# Patient Record
Sex: FEMALE | Race: ASIAN | NOT HISPANIC OR LATINO | URBAN - METROPOLITAN AREA
[De-identification: names, ages, dates, MRNs, and addresses within clinical notes are randomized per-mention and may not be internally consistent; named-entity substitution may affect disease eponyms.]

---

## 2017-04-26 PROBLEM — Z00.00 ENCOUNTER FOR PREVENTIVE HEALTH EXAMINATION: Status: ACTIVE | Noted: 2017-04-26

## 2017-05-04 ENCOUNTER — OUTPATIENT (OUTPATIENT)
Dept: OUTPATIENT SERVICES | Facility: HOSPITAL | Age: 57
LOS: 1 days | End: 2017-05-04
Payer: MEDICAID

## 2017-05-04 VITALS
DIASTOLIC BLOOD PRESSURE: 68 MMHG | HEIGHT: 65 IN | HEART RATE: 72 BPM | WEIGHT: 136.03 LBS | SYSTOLIC BLOOD PRESSURE: 112 MMHG | TEMPERATURE: 98 F | RESPIRATION RATE: 15 BRPM

## 2017-05-04 DIAGNOSIS — K81.2 ACUTE CHOLECYSTITIS WITH CHRONIC CHOLECYSTITIS: ICD-10-CM

## 2017-05-04 DIAGNOSIS — K80.20 CALCULUS OF GALLBLADDER WITHOUT CHOLECYSTITIS WITHOUT OBSTRUCTION: ICD-10-CM

## 2017-05-04 DIAGNOSIS — Z98.890 OTHER SPECIFIED POSTPROCEDURAL STATES: Chronic | ICD-10-CM

## 2017-05-04 DIAGNOSIS — E11.9 TYPE 2 DIABETES MELLITUS WITHOUT COMPLICATIONS: ICD-10-CM

## 2017-05-04 LAB
ALBUMIN SERPL ELPH-MCNC: 4.5 G/DL — SIGNIFICANT CHANGE UP (ref 3.3–5)
ALP SERPL-CCNC: 98 U/L — SIGNIFICANT CHANGE UP (ref 40–120)
ALT FLD-CCNC: 34 U/L — HIGH (ref 4–33)
AST SERPL-CCNC: 21 U/L — SIGNIFICANT CHANGE UP (ref 4–32)
BILIRUB SERPL-MCNC: 0.5 MG/DL — SIGNIFICANT CHANGE UP (ref 0.2–1.2)
BLD GP AB SCN SERPL QL: NEGATIVE — SIGNIFICANT CHANGE UP
BUN SERPL-MCNC: 14 MG/DL — SIGNIFICANT CHANGE UP (ref 7–23)
CALCIUM SERPL-MCNC: 9.5 MG/DL — SIGNIFICANT CHANGE UP (ref 8.4–10.5)
CHLORIDE SERPL-SCNC: 106 MMOL/L — SIGNIFICANT CHANGE UP (ref 98–107)
CO2 SERPL-SCNC: 26 MMOL/L — SIGNIFICANT CHANGE UP (ref 22–31)
CREAT SERPL-MCNC: 0.67 MG/DL — SIGNIFICANT CHANGE UP (ref 0.5–1.3)
GLUCOSE SERPL-MCNC: 180 MG/DL — HIGH (ref 70–99)
HBA1C BLD-MCNC: 8.2 % — HIGH (ref 4–5.6)
HCT VFR BLD CALC: 41.3 % — SIGNIFICANT CHANGE UP (ref 34.5–45)
HGB BLD-MCNC: 13.1 G/DL — SIGNIFICANT CHANGE UP (ref 11.5–15.5)
MCHC RBC-ENTMCNC: 27.8 PG — SIGNIFICANT CHANGE UP (ref 27–34)
MCHC RBC-ENTMCNC: 31.7 % — LOW (ref 32–36)
MCV RBC AUTO: 87.5 FL — SIGNIFICANT CHANGE UP (ref 80–100)
PLATELET # BLD AUTO: 286 K/UL — SIGNIFICANT CHANGE UP (ref 150–400)
PMV BLD: 10.2 FL — SIGNIFICANT CHANGE UP (ref 7–13)
POTASSIUM SERPL-MCNC: 3.8 MMOL/L — SIGNIFICANT CHANGE UP (ref 3.5–5.3)
POTASSIUM SERPL-SCNC: 3.8 MMOL/L — SIGNIFICANT CHANGE UP (ref 3.5–5.3)
PROT SERPL-MCNC: 8 G/DL — SIGNIFICANT CHANGE UP (ref 6–8.3)
RBC # BLD: 4.72 M/UL — SIGNIFICANT CHANGE UP (ref 3.8–5.2)
RBC # FLD: 13.5 % — SIGNIFICANT CHANGE UP (ref 10.3–14.5)
RH IG SCN BLD-IMP: POSITIVE — SIGNIFICANT CHANGE UP
SODIUM SERPL-SCNC: 146 MMOL/L — HIGH (ref 135–145)
WBC # BLD: 8.27 K/UL — SIGNIFICANT CHANGE UP (ref 3.8–10.5)
WBC # FLD AUTO: 8.27 K/UL — SIGNIFICANT CHANGE UP (ref 3.8–10.5)

## 2017-05-04 PROCEDURE — 93010 ELECTROCARDIOGRAM REPORT: CPT

## 2017-05-04 NOTE — H&P PST ADULT - LAST STRESS TEST
> 3 years, pass? "there was some problem", "at that time I was walking and feel a breathing problem"

## 2017-05-04 NOTE — H&P PST ADULT - PROBLEM SELECTOR PLAN 1
Pt. is scheduled for a laparoscopic cholecystectomy, possible open repair umbilical hernia 5/10/17.  Pt. states she had medical clearance. "last week."

## 2017-05-04 NOTE — H&P PST ADULT - HISTORY OF PRESENT ILLNESS
Pt. is a 58 yo female that had a significant weight loss.  Pt. was sent for an U/S which revealed cholelithiasis.  Pt. has an umbilical hernia.

## 2017-05-04 NOTE — H&P PST ADULT - NSANTHOSAYNRD_GEN_A_CORE
No. RYLEE screening performed.  STOP BANG Legend: 0-2 = LOW Risk; 3-4 = INTERMEDIATE Risk; 5-8 = HIGH Risk

## 2017-05-04 NOTE — H&P PST ADULT - PROBLEM SELECTOR PLAN 2
Instructed pt. last dose of Jardiance will be 5/8/17, no Glimepiride/Januvia the night before surgery, and no Glimepiride the morning of surgery.

## 2017-05-10 ENCOUNTER — TRANSCRIPTION ENCOUNTER (OUTPATIENT)
Age: 57
End: 2017-05-10

## 2017-05-10 ENCOUNTER — OUTPATIENT (OUTPATIENT)
Dept: OUTPATIENT SERVICES | Facility: HOSPITAL | Age: 57
LOS: 1 days | Discharge: HOME | End: 2017-05-10
Payer: MEDICAID

## 2017-05-10 ENCOUNTER — RESULT REVIEW (OUTPATIENT)
Age: 57
End: 2017-05-10

## 2017-05-10 VITALS
HEART RATE: 67 BPM | DIASTOLIC BLOOD PRESSURE: 74 MMHG | RESPIRATION RATE: 16 BRPM | SYSTOLIC BLOOD PRESSURE: 137 MMHG | WEIGHT: 136.69 LBS | OXYGEN SATURATION: 100 % | HEIGHT: 64.96 IN | TEMPERATURE: 98 F

## 2017-05-10 VITALS
HEART RATE: 70 BPM | RESPIRATION RATE: 18 BRPM | DIASTOLIC BLOOD PRESSURE: 69 MMHG | OXYGEN SATURATION: 99 % | SYSTOLIC BLOOD PRESSURE: 130 MMHG

## 2017-05-10 DIAGNOSIS — Z98.890 OTHER SPECIFIED POSTPROCEDURAL STATES: Chronic | ICD-10-CM

## 2017-05-10 DIAGNOSIS — K81.2 ACUTE CHOLECYSTITIS WITH CHRONIC CHOLECYSTITIS: ICD-10-CM

## 2017-05-10 LAB — RH IG SCN BLD-IMP: POSITIVE — SIGNIFICANT CHANGE UP

## 2017-05-10 PROCEDURE — 88304 TISSUE EXAM BY PATHOLOGIST: CPT | Mod: 26

## 2017-05-10 NOTE — ASU DISCHARGE PLAN (ADULT/PEDIATRIC). - NOTIFY
Fever greater than 101/Swelling that continues/Inability to Tolerate Liquids or Foods/Pain not relieved by Medications/Unable to Urinate/Persistent Nausea and Vomiting/Bleeding that does not stop

## 2017-05-10 NOTE — ASU DISCHARGE PLAN (ADULT/PEDIATRIC). - INSTRUCTIONS
Advance to regular diet as tolerated. Keep well hydrated. Shower starting in am, over the dressing. Increase fluids next 24-48 hrs.

## 2017-05-15 LAB — SURGICAL PATHOLOGY STUDY: SIGNIFICANT CHANGE UP

## 2018-01-27 ENCOUNTER — RESULT REVIEW (OUTPATIENT)
Age: 58
End: 2018-01-27

## 2019-05-04 ENCOUNTER — RESULT REVIEW (OUTPATIENT)
Age: 59
End: 2019-05-04

## 2021-02-02 NOTE — BRIEF OPERATIVE NOTE - VENOUS THROMBOEMBOLISM PROPHYLAXIS THERAPY
Impression: Presence of intraocular lens: Z96.1.

recommend patient use +2.00 OTC readers  Plan: stable. observe venodynes

## 2021-03-25 ENCOUNTER — RESULT REVIEW (OUTPATIENT)
Age: 61
End: 2021-03-25

## 2022-07-14 PROBLEM — K80.20 CALCULUS OF GALLBLADDER WITHOUT CHOLECYSTITIS WITHOUT OBSTRUCTION: Chronic | Status: ACTIVE | Noted: 2017-05-04

## 2022-07-14 PROBLEM — E11.9 TYPE 2 DIABETES MELLITUS WITHOUT COMPLICATIONS: Chronic | Status: ACTIVE | Noted: 2017-05-04

## 2022-07-14 PROBLEM — K42.9 UMBILICAL HERNIA WITHOUT OBSTRUCTION OR GANGRENE: Chronic | Status: ACTIVE | Noted: 2017-05-04

## 2022-07-14 PROBLEM — E78.5 HYPERLIPIDEMIA, UNSPECIFIED: Chronic | Status: ACTIVE | Noted: 2017-05-04

## 2022-07-21 ENCOUNTER — APPOINTMENT (OUTPATIENT)
Dept: ORTHOPEDIC SURGERY | Facility: CLINIC | Age: 62
End: 2022-07-21

## 2022-07-21 VITALS — HEIGHT: 65 IN | WEIGHT: 160 LBS | BODY MASS INDEX: 26.66 KG/M2

## 2022-07-21 DIAGNOSIS — Z78.9 OTHER SPECIFIED HEALTH STATUS: ICD-10-CM

## 2022-07-21 DIAGNOSIS — M50.30 OTHER CERVICAL DISC DEGENERATION, UNSPECIFIED CERVICAL REGION: ICD-10-CM

## 2022-07-21 DIAGNOSIS — M17.11 UNILATERAL PRIMARY OSTEOARTHRITIS, RIGHT KNEE: ICD-10-CM

## 2022-07-21 DIAGNOSIS — E78.00 PURE HYPERCHOLESTEROLEMIA, UNSPECIFIED: ICD-10-CM

## 2022-07-21 DIAGNOSIS — E11.9 TYPE 2 DIABETES MELLITUS W/OUT COMPLICATIONS: ICD-10-CM

## 2022-07-21 PROCEDURE — 72040 X-RAY EXAM NECK SPINE 2-3 VW: CPT

## 2022-07-21 PROCEDURE — 73010 X-RAY EXAM OF SHOULDER BLADE: CPT | Mod: LT

## 2022-07-21 PROCEDURE — 99204 OFFICE O/P NEW MOD 45 MIN: CPT

## 2022-07-21 PROCEDURE — 73030 X-RAY EXAM OF SHOULDER: CPT | Mod: LT

## 2022-07-21 PROCEDURE — 73564 X-RAY EXAM KNEE 4 OR MORE: CPT | Mod: RT

## 2022-07-21 RX ORDER — NAPROXEN 500 MG/1
500 TABLET ORAL TWICE DAILY
Qty: 30 | Refills: 1 | Status: ACTIVE | COMMUNITY
Start: 2022-07-21 | End: 1900-01-01

## 2022-07-21 NOTE — HISTORY OF PRESENT ILLNESS
[Gradual] : gradual [8] : 8 [3] : 3 [Dull/Aching] : dull/aching [de-identified] : 63yo RHD F with left shoulder and right knee pain for the past ~3 months with no injury. She had done PT for the shoulder ~2 months ago to minimal relief. She has seen her PCP for this issue a few weeks ago and was rx Celebrex to mild relief. Right knee pain and stiffness can effect the way she walks. Also with neck pain, denies n/t. Left shoulder pain and difficulty espeically with reaching behind. \par pmhx: dm A1c 7.4 / daily 125 [] : no [FreeTextEntry1] : rt knee, lt shoulder  [FreeTextEntry5] :  CHRISTI ERIC is a 62 year female who is here today for rt knee, lt shoulder pain. she has been having pain for a few months. states she has some swelling in the knee. denies injury. occasional numbness and tingling in arm.  [FreeTextEntry7] : shoulder pain radiates down the arm

## 2022-07-21 NOTE — PHYSICAL EXAM
[Rotation to left] : rotation to left [4___] : left grasp 4[unfilled]/5 [Straightening consistent with spasm] : Straightening consistent with spasm [Disc space narrowing] : Disc space narrowing [5___] : abduction 5[unfilled]/5 [Left] : left shoulder [There are no fractures, subluxations or dislocations. No significant abnormalities are seen] : There are no fractures, subluxations or dislocations. No significant abnormalities are seen [TWNoteComboBox7] : active forward flexion 160 degrees [de-identified] : active abduction 150 degrees [TWNoteComboBox6] : internal rotation L1 [de-identified] : external rotation at 90 degrees of abduction 90 degrees [TWNoteComboBox5] : internal rotation at 90 degrees of abduction 30 degrees [NL (0)] : extension 0 degrees [] : no extensor lag [Right] : right knee [AP] : anteroposterior [Lateral] : lateral [[place]] : skyline [AP Standing] : anteroposterior standing [Mild patellofemoral OA] : Mild patellofemoral OA

## 2022-07-21 NOTE — DISCUSSION/SUMMARY
[de-identified] : 62f with right knee pf djd, cervical ddd and left shoulder adhesive capsulitis\par 1) discussed csi for the left shoulder, will defer due to diabetes\par 2) physical therapy and dsicsused the importance of hep daily for left shoulder ROM\par 3) Naproxen rx - gi precautions reviewed \par 4) cryotherapy, rest and activity modification\par 5) rtc 6 weeks\par \par Entered by Cori Unger acting as scribe.\par

## 2022-07-22 PROBLEM — Z78.9 CURRENT NON-DRINKER OF ALCOHOL: Status: ACTIVE | Noted: 2022-07-21

## 2022-07-22 PROBLEM — Z78.9 NON-SMOKER: Status: ACTIVE | Noted: 2022-07-21

## 2022-09-01 ENCOUNTER — APPOINTMENT (OUTPATIENT)
Dept: ORTHOPEDIC SURGERY | Facility: CLINIC | Age: 62
End: 2022-09-01

## 2022-09-01 VITALS — BODY MASS INDEX: 26.49 KG/M2 | HEIGHT: 65 IN | WEIGHT: 159 LBS

## 2022-09-01 PROCEDURE — 99213 OFFICE O/P EST LOW 20 MIN: CPT

## 2022-09-01 RX ORDER — NAPROXEN 500 MG/1
500 TABLET ORAL TWICE DAILY
Qty: 30 | Refills: 1 | Status: ACTIVE | COMMUNITY
Start: 2022-09-01 | End: 1900-01-01

## 2022-09-01 NOTE — HISTORY OF PRESENT ILLNESS
[Gradual] : gradual [8] : 8 [3] : 3 [Dull/Aching] : dull/aching [Meds] : meds [de-identified] : 09/01/22: Here to f/up left shoulder. Doing hep. Reports continued left shoulder pain. Naproxen with partial relief. \par \par 61yo RHD F with left shoulder and right knee pain for the past ~3 months with no injury. She had done PT for the shoulder ~2 months ago to minimal relief. She has seen her PCP for this issue a few weeks ago and was rx Celebrex to mild relief. Right knee pain and stiffness can effect the way she walks. Also with neck pain, denies n/t. Left shoulder pain and difficulty espeically with reaching behind. \par pmhx: dm A1c 7.4 / daily 125 [] : no [FreeTextEntry1] : rt knee, lt shoulder  [FreeTextEntry5] :  CHRISTI ERIC is a 62 year female who is here today for rt knee, lt shoulder pain. she has been having pain for a few months. states she has some swelling in the knee. denies injury. occasional numbness and tingling in arm.  [FreeTextEntry7] : shoulder pain radiates down the arm [de-identified] : Naproxen - pt states this is helping reduce her pain

## 2022-09-01 NOTE — DISCUSSION/SUMMARY
[de-identified] : 62f with left shoulder adhesive capsulitis\par 1) MRI left shoulder, eval rotator cuff. \par 2) will consider csi after next A1c testing \par 3) c/w naproxen prn, hep, cryotherapy, rest and activity modification\par 4) rtc after MRI\par \par Entered by Cori Unger acting as scribe.\par

## 2022-09-01 NOTE — PHYSICAL EXAM
[Rotation to left] : rotation to left [4___] : left grasp 4[unfilled]/5 [Straightening consistent with spasm] : Straightening consistent with spasm [Disc space narrowing] : Disc space narrowing [5___] : abduction 5[unfilled]/5 [Left] : left shoulder [There are no fractures, subluxations or dislocations. No significant abnormalities are seen] : There are no fractures, subluxations or dislocations. No significant abnormalities are seen [NL (0)] : extension 0 degrees [Right] : right knee [AP] : anteroposterior [Lateral] : lateral [[place]] : skyline [AP Standing] : anteroposterior standing [Mild patellofemoral OA] : Mild patellofemoral OA [TWNoteComboBox7] : active forward flexion 160 degrees [de-identified] : active abduction 150 degrees [TWNoteComboBox6] : internal rotation L3 [de-identified] : external rotation at 90 degrees of abduction 90 degrees [TWNoteComboBox5] : internal rotation at 90 degrees of abduction 30 degrees [] : no extensor lag

## 2022-09-22 ENCOUNTER — APPOINTMENT (OUTPATIENT)
Dept: ORTHOPEDIC SURGERY | Facility: CLINIC | Age: 62
End: 2022-09-22

## 2022-09-22 VITALS — WEIGHT: 159 LBS | HEIGHT: 65 IN | BODY MASS INDEX: 26.49 KG/M2

## 2022-09-22 DIAGNOSIS — M75.02 ADHESIVE CAPSULITIS OF LEFT SHOULDER: ICD-10-CM

## 2022-09-22 PROCEDURE — 20611 DRAIN/INJ JOINT/BURSA W/US: CPT

## 2022-09-22 PROCEDURE — J3490M: CUSTOM

## 2022-09-22 PROCEDURE — 99214 OFFICE O/P EST MOD 30 MIN: CPT | Mod: 25

## 2022-09-22 NOTE — HISTORY OF PRESENT ILLNESS
[Gradual] : gradual [Dull/Aching] : dull/aching [] : yes [Meds] : meds [7] : 7 [5] : 5 [de-identified] : 9/22/22: Here to f/up left shoulder and review MRI results. \par 09/01/22: Here to f/up left shoulder. Doing hep. Reports continued left shoulder pain. Naproxen with partial relief. \par \par 63yo RHD F with left shoulder and right knee pain for the past ~3 months with no injury. She had done PT for the shoulder ~2 months ago to minimal relief. She has seen her PCP for this issue a few weeks ago and was rx Celebrex to mild relief. Right knee pain and stiffness can effect the way she walks. Also with neck pain, denies n/t. Left shoulder pain and difficulty espeically with reaching behind. \par pmhx: dm A1c 7.4 / daily 125 [FreeTextEntry1] :  lt shoulder  [FreeTextEntry5] :  CHRISTI ERIC is a 62 year female who is here today for left shoulder MRI results  [FreeTextEntry7] : shoulder pain radiates down the arm [de-identified] : MRI

## 2022-09-22 NOTE — DISCUSSION/SUMMARY
[de-identified] : 62f with left shoulder prct\par 1) csi left shoulder today - tolerated well\par 2) cryotherapy, rest and activity modification\par 3) home exercises\par \par \par Entered by Cori Unger acting as scribe.\par

## 2022-09-22 NOTE — PHYSICAL EXAM
[Rotation to left] : rotation to left [4___] : left grasp 4[unfilled]/5 [Straightening consistent with spasm] : Straightening consistent with spasm [Disc space narrowing] : Disc space narrowing [5___] : abduction 5[unfilled]/5 [Left] : left shoulder [There are no fractures, subluxations or dislocations. No significant abnormalities are seen] : There are no fractures, subluxations or dislocations. No significant abnormalities are seen [] : no atrophy [TWNoteComboBox7] : active forward flexion 160 degrees [de-identified] : active abduction 150 degrees [TWNoteComboBox6] : internal rotation L3 [de-identified] : external rotation at 90 degrees of abduction 90 degrees [TWNoteComboBox5] : internal rotation at 90 degrees of abduction 30 degrees

## 2022-09-22 NOTE — DATA REVIEWED
[MRI] : MRI [Left] : left [Shoulder] : shoulder [Report was reviewed and noted in the chart] : The report was reviewed and noted in the chart [I independently reviewed and interpreted images and report] : I independently reviewed and interpreted images and report [I reviewed the films/CD] : I reviewed the films/CD

## 2022-09-22 NOTE — PROCEDURE
[FreeTextEntry3] : Large joint injection was performed of the left shoulder. An injection of Lidocaine 3cc of 1% , Bupivacaine (Marcaine) 6cc of 0.5% , Triamcinolone (Kenalog) 2cc of 40 mg  was used. Patient was advised to call if redness, pain or fever occur and apply ice for 15 minutes out of every hour for the next 12-24 hours as tolerated. \par \par Patient has tried OTC's including aspirin, Ibuprofen, Aleve, etc or prescription NSAIDS, and/or exercises at home and/or physical therapy without satisfactory response, patient had decreased mobility in the joint and the risks benefits, and alternatives have been discussed, and verbal consent was obtained. \par The site was prepped with alcohol, betadine and ethyl chloride sprayed topically\par \par The risks, benefits and contents of the injection have been discussed.  Risks include but are not limited to allergic reaction, flare reaction, permanent white skin discoloration at the injection site and infection.  The patient understands the risks and agrees to having the injection.  All questions have been answered.\par \par Ultrasound guidance was indicated for this patient due to prior failure or difficult injection. All ultrasound images have been permanently captured and stored accordingly in our picture archiving and communication system.\par

## 2022-11-03 ENCOUNTER — APPOINTMENT (OUTPATIENT)
Dept: ORTHOPEDIC SURGERY | Facility: CLINIC | Age: 62
End: 2022-11-03

## 2022-11-03 VITALS — BODY MASS INDEX: 25.99 KG/M2 | WEIGHT: 156 LBS | HEIGHT: 65 IN

## 2022-11-03 DIAGNOSIS — M75.112 INCOMPLETE ROTATOR CUFF TEAR OR RUPTURE OF LEFT SHOULDER, NOT SPECIFIED AS TRAUMATIC: ICD-10-CM

## 2022-11-03 PROCEDURE — 99213 OFFICE O/P EST LOW 20 MIN: CPT

## 2022-11-03 NOTE — HISTORY OF PRESENT ILLNESS
[Sharp] : sharp [Constant] : constant [Household chores] : household chores [Leisure] : leisure [de-identified] : 11/3/22: Here for fu L shoulder. CSI helped. She is going to PT. \par 9/22/22: Here to f/up left shoulder and review MRI results. \par 09/01/22: Here to f/up left shoulder. Doing hep. Reports continued left shoulder pain. Naproxen with partial relief. \par \par 61yo RHD F with left shoulder and right knee pain for the past ~3 months with no injury. She had done PT for the shoulder ~2 months ago to minimal relief. She has seen her PCP for this issue a few weeks ago and was rx Celebrex to mild relief. Right knee pain and stiffness can effect the way she walks. Also with neck pain, denies n/t. Left shoulder pain and difficulty espeically with reaching behind. \par pmhx: dm A1c 7.4 / daily 125 [Gradual] : gradual [7] : 7 [5] : 5 [Dull/Aching] : dull/aching [Meds] : meds [] : no [FreeTextEntry1] :  lt shoulder  [FreeTextEntry5] :  CHRISTI ERIC is a 62 year female who is here today for left shoulder MRI results  [FreeTextEntry7] : shoulder pain radiates down the arm [de-identified] : MRI
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
## 2022-11-03 NOTE — PHYSICAL EXAM
[Rotation to left] : rotation to left [4___] : left grasp 4[unfilled]/5 [Straightening consistent with spasm] : Straightening consistent with spasm [Disc space narrowing] : Disc space narrowing [5___] : abduction 5[unfilled]/5 [] : positive impingement testing [Left] : left shoulder [There are no fractures, subluxations or dislocations. No significant abnormalities are seen] : There are no fractures, subluxations or dislocations. No significant abnormalities are seen [TWNoteComboBox7] : active forward flexion 160 degrees [de-identified] : active abduction 150 degrees [TWNoteComboBox6] : internal rotation L3 [de-identified] : external rotation at 90 degrees of abduction 90 degrees [TWNoteComboBox5] : internal rotation at 90 degrees of abduction 30 degrees

## 2023-04-09 ENCOUNTER — EMERGENCY (EMERGENCY)
Facility: HOSPITAL | Age: 63
LOS: 1 days | Discharge: HOME | End: 2023-04-09
Attending: STUDENT IN AN ORGANIZED HEALTH CARE EDUCATION/TRAINING PROGRAM
Payer: COMMERCIAL

## 2023-04-09 VITALS
RESPIRATION RATE: 20 BRPM | WEIGHT: 158.07 LBS | TEMPERATURE: 98 F | HEIGHT: 67 IN | SYSTOLIC BLOOD PRESSURE: 150 MMHG | DIASTOLIC BLOOD PRESSURE: 85 MMHG | HEART RATE: 86 BPM | OXYGEN SATURATION: 98 %

## 2023-04-09 DIAGNOSIS — Z98.890 OTHER SPECIFIED POSTPROCEDURAL STATES: Chronic | ICD-10-CM

## 2023-04-09 PROCEDURE — 72125 CT NECK SPINE W/O DYE: CPT | Mod: MD

## 2023-04-09 PROCEDURE — 99284 EMERGENCY DEPT VISIT MOD MDM: CPT | Mod: 25

## 2023-04-09 PROCEDURE — 71045 X-RAY EXAM CHEST 1 VIEW: CPT | Mod: 26

## 2023-04-09 PROCEDURE — 70486 CT MAXILLOFACIAL W/O DYE: CPT | Mod: MD

## 2023-04-09 PROCEDURE — 70450 CT HEAD/BRAIN W/O DYE: CPT | Mod: 26,MD

## 2023-04-09 PROCEDURE — 70486 CT MAXILLOFACIAL W/O DYE: CPT | Mod: 26,MD

## 2023-04-09 PROCEDURE — 71045 X-RAY EXAM CHEST 1 VIEW: CPT

## 2023-04-09 PROCEDURE — 76377 3D RENDER W/INTRP POSTPROCES: CPT

## 2023-04-09 PROCEDURE — 99285 EMERGENCY DEPT VISIT HI MDM: CPT

## 2023-04-09 PROCEDURE — 76377 3D RENDER W/INTRP POSTPROCES: CPT | Mod: 26

## 2023-04-09 PROCEDURE — 72125 CT NECK SPINE W/O DYE: CPT | Mod: 26,MD

## 2023-04-09 PROCEDURE — 70450 CT HEAD/BRAIN W/O DYE: CPT | Mod: MD

## 2023-04-09 RX ORDER — ACETAMINOPHEN 500 MG
975 TABLET ORAL ONCE
Refills: 0 | Status: COMPLETED | OUTPATIENT
Start: 2023-04-09 | End: 2023-04-09

## 2023-04-09 RX ADMIN — Medication 975 MILLIGRAM(S): at 22:00

## 2023-04-09 NOTE — ED PROVIDER NOTE - NSICDXPASTMEDICALHX_GEN_ALL_CORE_FT
PAST MEDICAL HISTORY:  Cholelithiasis     Hyperlipidemia     Type 2 diabetes mellitus     Umbilical hernia
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
## 2023-04-09 NOTE — ED PROVIDER NOTE - NS ED ROS FT
CONSTITUTIONAL: No fevers, no chills, no lightheadedness, no dizziness  EYES: no visual changes, no eye pain  EARS: no ear drainage, no ear pain, no change in hearing  NOSE: no nasal congestion  MOUTH/THROAT: no sore throat  CV: No chest pain, no palpitations  RESP: No SOB, no cough  GI: No n/v/d, no abd pain  : no dysuria, no hematuria, no flank pain  MSK: +back pain, +extremity pain  SKIN: no rashes  NEURO: no headache, no focal weakness, no decreased sensation/parasthesias   PSYCHIATRIC: no known mental health issues

## 2023-04-09 NOTE — ED PROVIDER NOTE - PHYSICAL EXAMINATION
Physical Exam:  Gen: NAD, AOx3, non-toxic appearing, able to ambulate without assistance  Head: NCAT; R cheek ecchymosis and swelling  HEENT: EOMI, PEERLA, normal conjunctiva, tongue midline, oral mucosa moist; midline base of neck tenderness to palpation  Lung: CTAB, no respiratory distress, no wheezes/rhonchi/rales B/L, speaking in full sentences  CV: RRR, no murmurs, rubs or gallops  Abd: soft, NT, ND, no guarding, no rigidity, no rebound tenderness, no CVA tenderness   MSK: no visible deformities, ROM normal in UE/LE, no back pain  Neuro: No focal sensory or motor deficits  Skin: Warm, well perfused, no rash, no leg swelling  Psych: normal affect, calm

## 2023-04-09 NOTE — ED PROVIDER NOTE - PATIENT PORTAL LINK FT
You can access the FollowMyHealth Patient Portal offered by [facility] by registering at the following website: [email or web address]. By joining [employer]’s FollowMyHealth portal, you will also be able to view your health information using other applications (apps) compatible with our system.

## 2023-04-09 NOTE — ED ADULT TRIAGE NOTE - BP NONINVASIVE DIASTOLIC (MM HG)
Writer called and spoke with patient and informed her that she has been approved to be a Living Kidney Donor by the Selection Committee Review Board.  Writer stated that the transplant surgeons are still reviewing CT images to choose the kidney appropriate for donation.  Patient verbalizes understanding.   
84

## 2023-04-09 NOTE — ED PROVIDER NOTE - NSICDXFAMILYHX_GEN_ALL_CORE_FT
FAMILY HISTORY:  Father  Still living? Yes, Estimated age: 90  Type 2 diabetes mellitus, Age at diagnosis: Age Unknown    Mother  Still living? Yes, Estimated age: 80  Type 2 diabetes mellitus, Age at diagnosis: Age Unknown

## 2023-04-10 VITALS
SYSTOLIC BLOOD PRESSURE: 122 MMHG | OXYGEN SATURATION: 96 % | HEART RATE: 77 BPM | RESPIRATION RATE: 16 BRPM | DIASTOLIC BLOOD PRESSURE: 74 MMHG | TEMPERATURE: 98 F

## 2023-04-10 NOTE — ED ADULT NURSE REASSESSMENT NOTE - NS ED NURSE REASSESS COMMENT FT1
Patient d/c. Reviewed d/c paperwork with patients, all questions answered at this time. Patient verbalizes understanding. Patient instructed to return to the ER for any worsening s/s including chest pain, SOB, fever, n/v/d. Patient alert and stable at time of d/c. Patient instructed to f/o with  PCP. c-collar removed by MD Rea prior to dc.

## 2023-09-20 ENCOUNTER — OFFICE (OUTPATIENT)
Dept: URBAN - METROPOLITAN AREA CLINIC 35 | Facility: CLINIC | Age: 63
Setting detail: OPHTHALMOLOGY
End: 2023-09-20
Payer: COMMERCIAL

## 2023-09-20 ENCOUNTER — RX ONLY (RX ONLY)
Age: 63
End: 2023-09-20

## 2023-09-20 DIAGNOSIS — H25.13: ICD-10-CM

## 2023-09-20 DIAGNOSIS — H52.7: ICD-10-CM

## 2023-09-20 DIAGNOSIS — H02.89: ICD-10-CM

## 2023-09-20 DIAGNOSIS — H25.013: ICD-10-CM

## 2023-09-20 DIAGNOSIS — H02.831: ICD-10-CM

## 2023-09-20 DIAGNOSIS — H02.834: ICD-10-CM

## 2023-09-20 DIAGNOSIS — E11.9: ICD-10-CM

## 2023-09-20 DIAGNOSIS — H52.03: ICD-10-CM

## 2023-09-20 DIAGNOSIS — H16.223: ICD-10-CM

## 2023-09-20 PROCEDURE — 92004 COMPRE OPH EXAM NEW PT 1/>: CPT | Performed by: OPHTHALMOLOGY

## 2023-09-20 PROCEDURE — 92250 FUNDUS PHOTOGRAPHY W/I&R: CPT | Performed by: OPHTHALMOLOGY

## 2023-09-20 PROCEDURE — 83861 MICROFLUID ANALY TEARS: CPT | Performed by: OPHTHALMOLOGY

## 2023-09-20 PROCEDURE — 92015 DETERMINE REFRACTIVE STATE: CPT | Performed by: OPHTHALMOLOGY

## 2023-09-20 ASSESSMENT — REFRACTION_MANIFEST
OS_SPHERE: +2.50
OS_VA1: 20/20
OS_SPHERE: +2.75
OD_VA1: 20/25+
OS_CYLINDER: -1.00
OD_AXIS: 090
OS_VA1: 20/20
OD_AXIS: 090
OD_CYLINDER: -1.00
OS_CYLINDER: -0.75
OD_CYLINDER: -0.75
OD_SPHERE: +2.50
OS_AXIS: 075
OD_VA1: 20/20
OS_AXIS: 075
OD_SPHERE: +3.00

## 2023-09-20 ASSESSMENT — SPHEQUIV_DERIVED
OD_SPHEQUIV: 2.375
OS_SPHEQUIV: 2.25
OS_SPHEQUIV: 2.125
OS_SPHEQUIV: 2.125
OD_SPHEQUIV: 2.625
OD_SPHEQUIV: 2

## 2023-09-20 ASSESSMENT — TONOMETRY
OS_IOP_MMHG: 16
OD_IOP_MMHG: 16

## 2023-09-20 ASSESSMENT — KERATOMETRY
OD_AXISANGLE_DEGREES: 176
OD_K1POWER_DIOPTERS: 43.00
OS_AXISANGLE_DEGREES: 120
OS_K1POWER_DIOPTERS: 43.00
OS_K2POWER_DIOPTERS: 43.75
OD_K2POWER_DIOPTERS: 43.25

## 2023-09-20 ASSESSMENT — AXIALLENGTH_DERIVED
OS_AL: 22.835
OS_AL: 22.835
OD_AL: 22.9674
OD_AL: 22.7389
OS_AL: 22.7895
OD_AL: 22.8298

## 2023-09-20 ASSESSMENT — REFRACTION_AUTOREFRACTION
OD_AXIS: 088
OS_CYLINDER: -0.75
OD_CYLINDER: -0.75
OD_SPHERE: +2.75
OS_AXIS: 075
OS_SPHERE: +2.50

## 2023-09-20 ASSESSMENT — REFRACTION_CURRENTRX
OD_SPHERE: +2.50
OS_SPHERE: +2.00
OS_AXIS: 087
OS_OVR_VA: 20/
OD_VPRISM_DIRECTION: BF
OD_CYLINDER: +0.75
OD_OVR_VA: 20/
OS_VPRISM_DIRECTION: BF
OD_ADD: +2.50
OS_CYLINDER: -1.00
OD_AXIS: 099
OS_ADD: +2.50

## 2023-09-20 ASSESSMENT — LID EXAM ASSESSMENTS
OD_COMMENTS: INSPISSATED MEIBOMIAN GLANDS
OS_COMMENTS: INSPISSATED MEIBOMIAN GLANDS
OS_MEIBOMITIS: LLL 1+
OD_MEIBOMITIS: RLL 1+

## 2023-09-20 ASSESSMENT — VISUAL ACUITY
OS_BCVA: 20/25-2
OD_BCVA: 20/40

## 2023-09-20 ASSESSMENT — CONFRONTATIONAL VISUAL FIELD TEST (CVF)
OD_FINDINGS: FULL
OS_FINDINGS: FULL

## 2023-09-20 ASSESSMENT — LID POSITION - DERMATOCHALASIS
OS_DERMATOCHALASIS: LUL 2+
OD_DERMATOCHALASIS: RUL 2+

## 2023-09-24 NOTE — H&P PST ADULT - FAMILY HISTORY
normal...
Father  Still living? Yes, Estimated age: 90  Type 2 diabetes mellitus, Age at diagnosis: Age Unknown     Mother  Still living? Yes, Estimated age: 80  Type 2 diabetes mellitus, Age at diagnosis: Age Unknown

## 2025-04-07 ENCOUNTER — DOCTOR'S OFFICE (OUTPATIENT)
Facility: LOCATION | Age: 65
Setting detail: OPHTHALMOLOGY
End: 2025-04-07
Payer: COMMERCIAL

## 2025-04-07 DIAGNOSIS — H16.223: ICD-10-CM

## 2025-04-07 DIAGNOSIS — H52.4: ICD-10-CM

## 2025-04-07 DIAGNOSIS — E11.9: ICD-10-CM

## 2025-04-07 DIAGNOSIS — H02.834: ICD-10-CM

## 2025-04-07 DIAGNOSIS — H02.831: ICD-10-CM

## 2025-04-07 DIAGNOSIS — H25.013: ICD-10-CM

## 2025-04-07 DIAGNOSIS — H25.13: ICD-10-CM

## 2025-04-07 DIAGNOSIS — H02.89: ICD-10-CM

## 2025-04-07 PROCEDURE — 92250 FUNDUS PHOTOGRAPHY W/I&R: CPT | Performed by: OPHTHALMOLOGY

## 2025-04-07 PROCEDURE — 92015 DETERMINE REFRACTIVE STATE: CPT | Performed by: OPHTHALMOLOGY

## 2025-04-07 PROCEDURE — 92014 COMPRE OPH EXAM EST PT 1/>: CPT | Performed by: OPHTHALMOLOGY

## 2025-04-07 ASSESSMENT — REFRACTION_AUTOREFRACTION
OS_SPHERE: +2.25
OD_AXIS: 077
OD_CYLINDER: -0.75
OS_CYLINDER: -0.25
OD_SPHERE: +3.00
OS_AXIS: 089

## 2025-04-07 ASSESSMENT — KERATOMETRY
OD_K1POWER_DIOPTERS: 43.75
OS_AXISANGLE_DEGREES: 090
OS_K1POWER_DIOPTERS: 43.50
OD_AXISANGLE_DEGREES: 051
OD_K2POWER_DIOPTERS: 44.00
OS_K2POWER_DIOPTERS: 43.50

## 2025-04-07 ASSESSMENT — REFRACTION_MANIFEST
OU_VA: 20/25
OU_VA: 20/20
OD_ADD: +2.75
OD_SPHERE: +4.00
OD_AXIS: 075
OS_ADD: +2.75
OS_AXIS: 090
OD_CYLINDER: -0.75
OD_VA1: 20/20
OD_AXIS: 080
OS_VA1: 20/20
OS_VA1: 20/25
OS_SPHERE: +2.50
OD_CYLINDER: -1.00
OD_AXIS: 090
OD_SPHERE: +3.00
OS_VA1: 20/20
OS_VA1: 20/20
OS_SPHERE: +2.75
OD_VA1: 20/25+
OS_SPHERE: +2.50
OD_VA1: 20/20
OD_SPHERE: +2.50
OS_CYLINDER: -1.00
OS_CYLINDER: -0.25
OD_CYLINDER: -0.75
OD_VA1: 20/25
OS_AXIS: 075
OS_AXIS: 090
OD_AXIS: 090
OD_CYLINDER: -1.00
OS_SPHERE: +2.25
OS_CYLINDER: -0.75
OS_CYLINDER: -0.50
OD_SPHERE: +3.75
OS_AXIS: 075

## 2025-04-07 ASSESSMENT — REFRACTION_CURRENTRX
OD_VPRISM_DIRECTION: BF
OS_CYLINDER: -1.00
OS_ADD: +2.50
OS_SPHERE: +2.50
OD_SPHERE: +3.00
OD_ADD: +2.50
OS_CYLINDER: -0.50
OD_VPRISM_DIRECTION: BF
OD_CYLINDER: -0.75
OS_VPRISM_DIRECTION: BF
OD_ADD: +2.50
OD_CYLINDER: +0.75
OS_OVR_VA: 20/
OS_VPRISM_DIRECTION: BF
OS_AXIS: 073
OD_OVR_VA: 20/
OS_AXIS: 087
OD_OVR_VA: 20/
OD_AXIS: 099
OD_AXIS: 098
OD_SPHERE: +2.50
OS_SPHERE: +2.00
OS_OVR_VA: 20/
OS_ADD: +2.50

## 2025-04-07 ASSESSMENT — VISUAL ACUITY
OS_BCVA: 20/25
OD_BCVA: 20/20

## 2025-04-07 ASSESSMENT — CONFRONTATIONAL VISUAL FIELD TEST (CVF)
OS_FINDINGS: FULL
OD_FINDINGS: FULL

## 2025-04-07 ASSESSMENT — TONOMETRY
OD_IOP_MMHG: 15
OS_IOP_MMHG: 15

## 2025-04-07 ASSESSMENT — LID POSITION - DERMATOCHALASIS
OS_DERMATOCHALASIS: LUL 2+
OD_DERMATOCHALASIS: RUL 2+

## 2025-04-07 ASSESSMENT — LID EXAM ASSESSMENTS
OS_COMMENTS: INSPISSATED MEIBOMIAN GLANDS
OD_MEIBOMITIS: RLL 1+
OS_MEIBOMITIS: LLL 1+
OD_COMMENTS: INSPISSATED MEIBOMIAN GLANDS